# Patient Record
Sex: MALE | Race: WHITE | NOT HISPANIC OR LATINO | Employment: FULL TIME | URBAN - METROPOLITAN AREA
[De-identification: names, ages, dates, MRNs, and addresses within clinical notes are randomized per-mention and may not be internally consistent; named-entity substitution may affect disease eponyms.]

---

## 2019-03-15 PROBLEM — N44.00 RIGHT TESTICULAR TORSION: Status: ACTIVE | Noted: 2019-03-15

## 2019-12-17 ENCOUNTER — OCCUPATIONAL HEALTH (OUTPATIENT)
Dept: URGENT CARE | Facility: CLINIC | Age: 19
End: 2019-12-17

## 2019-12-17 DIAGNOSIS — N44.00 RIGHT TESTICULAR TORSION: ICD-10-CM

## 2019-12-17 LAB
CTP QC/QA: YES
POC 10 PANEL DRUG SCREEN: NEGATIVE
POC BREATH ALCOHOL: NEGATIVE

## 2019-12-18 LAB
ALBUMIN SERPL-MCNC: 4.9 G/DL (ref 3.5–5.5)
ALBUMIN/GLOB SERPL: 1.9 {RATIO} (ref 1.2–2.2)
ALP SERPL-CCNC: 60 IU/L (ref 39–117)
ALT SERPL-CCNC: 29 IU/L (ref 0–44)
APPEARANCE UR: CLEAR
AST SERPL-CCNC: 25 IU/L (ref 0–40)
BASOPHILS # BLD AUTO: 0 X10E3/UL (ref 0–0.2)
BASOPHILS NFR BLD AUTO: 1 %
BILIRUB SERPL-MCNC: 0.6 MG/DL (ref 0–1.2)
BILIRUB UR QL STRIP: NEGATIVE
BUN SERPL-MCNC: 8 MG/DL (ref 6–20)
BUN/CREAT SERPL: 8 (ref 9–20)
CALCIUM SERPL-MCNC: 9.9 MG/DL (ref 8.7–10.2)
CHLORIDE SERPL-SCNC: 100 MMOL/L (ref 96–106)
CHOLEST SERPL-MCNC: 163 MG/DL (ref 100–199)
CO2 SERPL-SCNC: 25 MMOL/L (ref 20–29)
COLOR UR: YELLOW
CREAT SERPL-MCNC: 0.98 MG/DL (ref 0.76–1.27)
EOSINOPHIL # BLD AUTO: 0.2 X10E3/UL (ref 0–0.4)
EOSINOPHIL NFR BLD AUTO: 4 %
ERYTHROCYTE [DISTWIDTH] IN BLOOD BY AUTOMATED COUNT: 13.3 % (ref 12.3–15.4)
GLOBULIN SER CALC-MCNC: 2.6 G/DL (ref 1.5–4.5)
GLUCOSE SERPL-MCNC: 105 MG/DL (ref 65–99)
GLUCOSE UR QL: NEGATIVE
HCT VFR BLD AUTO: 44 % (ref 37.5–51)
HDLC SERPL-MCNC: 40 MG/DL
HGB BLD-MCNC: 14.7 G/DL (ref 13–17.7)
HGB UR QL STRIP: NEGATIVE
HIV 1+2 AB+HIV1 P24 AG SERPL QL IA: NON REACTIVE
IMM GRANULOCYTES # BLD AUTO: 0 X10E3/UL (ref 0–0.1)
IMM GRANULOCYTES NFR BLD AUTO: 0 %
KETONES UR QL STRIP: NEGATIVE
LDLC SERPL CALC-MCNC: 101 MG/DL (ref 0–99)
LEUKOCYTE ESTERASE UR QL STRIP: NEGATIVE
LYMPHOCYTES # BLD AUTO: 1.8 X10E3/UL (ref 0.7–3.1)
LYMPHOCYTES NFR BLD AUTO: 35 %
MCH RBC QN AUTO: 30.2 PG (ref 26.6–33)
MCHC RBC AUTO-ENTMCNC: 33.4 G/DL (ref 31.5–35.7)
MCV RBC AUTO: 91 FL (ref 79–97)
MICRO URNS: NORMAL
MONOCYTES # BLD AUTO: 0.4 X10E3/UL (ref 0.1–0.9)
MONOCYTES NFR BLD AUTO: 7 %
NEUTROPHILS # BLD AUTO: 2.8 X10E3/UL (ref 1.4–7)
NEUTROPHILS NFR BLD AUTO: 53 %
NITRITE UR QL STRIP: NEGATIVE
PH UR STRIP: 7 [PH] (ref 5–7.5)
PLATELET # BLD AUTO: 217 X10E3/UL (ref 150–450)
POTASSIUM SERPL-SCNC: 5 MMOL/L (ref 3.5–5.2)
PROT SERPL-MCNC: 7.5 G/DL (ref 6–8.5)
PROT UR QL STRIP: NEGATIVE
RBC # BLD AUTO: 4.86 X10E6/UL (ref 4.14–5.8)
RPR SER QL: NON REACTIVE
SODIUM SERPL-SCNC: 141 MMOL/L (ref 134–144)
SP GR UR: 1.02 (ref 1–1.03)
TRIGL SERPL-MCNC: 112 MG/DL (ref 0–149)
UROBILINOGEN UR STRIP-MCNC: 1 MG/DL (ref 0.2–1)
VLDLC SERPL CALC-MCNC: 22 MG/DL (ref 5–40)
WBC # BLD AUTO: 5.2 X10E3/UL (ref 3.4–10.8)

## 2019-12-19 ENCOUNTER — OCCUPATIONAL HEALTH (OUTPATIENT)
Dept: URGENT CARE | Facility: CLINIC | Age: 19
End: 2019-12-19

## 2021-03-22 ENCOUNTER — OCCUPATIONAL HEALTH (OUTPATIENT)
Dept: URGENT CARE | Facility: CLINIC | Age: 21
End: 2021-03-22

## 2021-03-22 PROCEDURE — 80305 PR NON-DOT DRUG SCREENS: ICD-10-PCS | Mod: S$GLB,,, | Performed by: EMERGENCY MEDICINE

## 2021-03-22 PROCEDURE — 80305 DRUG TEST PRSMV DIR OPT OBS: CPT | Mod: S$GLB,,, | Performed by: EMERGENCY MEDICINE

## 2021-04-16 ENCOUNTER — CLINICAL SUPPORT (OUTPATIENT)
Dept: OTHER | Facility: CLINIC | Age: 21
End: 2021-04-16
Payer: COMMERCIAL

## 2021-04-16 DIAGNOSIS — Z00.8 ENCOUNTER FOR OTHER GENERAL EXAMINATION: ICD-10-CM

## 2021-04-16 PROCEDURE — 99401 PR PREVENT COUNSEL,INDIV,15 MIN: ICD-10-PCS | Mod: 25,S$GLB,, | Performed by: INTERNAL MEDICINE

## 2021-04-16 PROCEDURE — 80061 LIPID PANEL: CPT | Mod: QW,S$GLB,, | Performed by: INTERNAL MEDICINE

## 2021-04-16 PROCEDURE — 82947 PR  ASSAY QUANTITATIVE,BLOOD GLUCOSE: ICD-10-PCS | Mod: QW,S$GLB,, | Performed by: INTERNAL MEDICINE

## 2021-04-16 PROCEDURE — 99401 PREV MED CNSL INDIV APPRX 15: CPT | Mod: 25,S$GLB,, | Performed by: INTERNAL MEDICINE

## 2021-04-16 PROCEDURE — 82947 ASSAY GLUCOSE BLOOD QUANT: CPT | Mod: QW,S$GLB,, | Performed by: INTERNAL MEDICINE

## 2021-04-16 PROCEDURE — 80061 PR  LIPID PANEL: ICD-10-PCS | Mod: QW,S$GLB,, | Performed by: INTERNAL MEDICINE

## 2021-04-17 VITALS — BODY MASS INDEX: 31.66 KG/M2 | HEIGHT: 72 IN

## 2021-04-17 LAB
GLUCOSE SERPL-MCNC: 97 MG/DL (ref 60–140)
HDLC SERPL-MCNC: 41 MG/DL
POC CHOLESTEROL, LDL (DOCK): 93 MG/DL
POC CHOLESTEROL, TOTAL: 148 MG/DL
TRIGL SERPL-MCNC: 70 MG/DL

## 2021-05-10 ENCOUNTER — PATIENT MESSAGE (OUTPATIENT)
Dept: RESEARCH | Facility: HOSPITAL | Age: 21
End: 2021-05-10

## 2025-03-13 ENCOUNTER — OFFICE VISIT (OUTPATIENT)
Dept: FAMILY MEDICINE | Facility: CLINIC | Age: 25
End: 2025-03-13
Payer: COMMERCIAL

## 2025-03-13 VITALS
BODY MASS INDEX: 30.33 KG/M2 | HEART RATE: 78 BPM | OXYGEN SATURATION: 72 % | WEIGHT: 228.81 LBS | RESPIRATION RATE: 18 BRPM | SYSTOLIC BLOOD PRESSURE: 102 MMHG | TEMPERATURE: 98 F | DIASTOLIC BLOOD PRESSURE: 68 MMHG | HEIGHT: 73 IN

## 2025-03-13 DIAGNOSIS — Z79.899 ENCOUNTER FOR LONG-TERM CURRENT USE OF MEDICATION: ICD-10-CM

## 2025-03-13 DIAGNOSIS — Z23 NEED FOR VACCINATION: ICD-10-CM

## 2025-03-13 DIAGNOSIS — M54.12 RIGHT CERVICAL RADICULOPATHY: Primary | ICD-10-CM

## 2025-03-13 DIAGNOSIS — Z11.4 ENCOUNTER FOR SCREENING FOR HIV: ICD-10-CM

## 2025-03-13 DIAGNOSIS — Z11.59 NEED FOR HEPATITIS C SCREENING TEST: ICD-10-CM

## 2025-03-13 PROCEDURE — 99999 PR PBB SHADOW E&M-NEW PATIENT-LVL III: CPT | Mod: PBBFAC,,, | Performed by: DIETITIAN, REGISTERED
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
## 2025-03-20 ENCOUNTER — HOSPITAL ENCOUNTER (OUTPATIENT)
Dept: RADIOLOGY | Facility: HOSPITAL | Age: 25
Discharge: HOME | End: 2025-03-20
Attending: DIETITIAN, REGISTERED
Payer: COMMERCIAL

## 2025-03-20 ENCOUNTER — RESULTS FOLLOW-UP (OUTPATIENT)
Dept: FAMILY MEDICINE | Facility: CLINIC | Age: 25
End: 2025-03-20

## 2025-03-20 DIAGNOSIS — M54.12 RIGHT CERVICAL RADICULOPATHY: ICD-10-CM

## 2025-03-20 PROCEDURE — 72070 X-RAY EXAM THORAC SPINE 2VWS: CPT | Mod: TC,PO

## 2025-03-20 PROCEDURE — 72040 X-RAY EXAM NECK SPINE 2-3 VW: CPT | Mod: 26,,, | Performed by: STUDENT IN AN ORGANIZED HEALTH CARE EDUCATION/TRAINING PROGRAM

## 2025-03-20 PROCEDURE — 72040 X-RAY EXAM NECK SPINE 2-3 VW: CPT | Mod: TC,PO

## 2025-03-20 PROCEDURE — 72070 X-RAY EXAM THORAC SPINE 2VWS: CPT | Mod: 26,,, | Performed by: STUDENT IN AN ORGANIZED HEALTH CARE EDUCATION/TRAINING PROGRAM

## 2025-07-25 ENCOUNTER — OFFICE VISIT (OUTPATIENT)
Dept: FAMILY MEDICINE | Facility: CLINIC | Age: 25
End: 2025-07-25
Payer: COMMERCIAL

## 2025-07-25 ENCOUNTER — LAB VISIT (OUTPATIENT)
Dept: LAB | Facility: HOSPITAL | Age: 25
End: 2025-07-25
Attending: DIETITIAN, REGISTERED
Payer: COMMERCIAL

## 2025-07-25 VITALS
BODY MASS INDEX: 27.53 KG/M2 | HEART RATE: 91 BPM | WEIGHT: 207.69 LBS | TEMPERATURE: 98 F | OXYGEN SATURATION: 98 % | HEIGHT: 73 IN | RESPIRATION RATE: 18 BRPM | DIASTOLIC BLOOD PRESSURE: 68 MMHG | SYSTOLIC BLOOD PRESSURE: 116 MMHG

## 2025-07-25 DIAGNOSIS — R53.83 FATIGUE, UNSPECIFIED TYPE: ICD-10-CM

## 2025-07-25 DIAGNOSIS — K52.9 GASTROENTERITIS: Primary | ICD-10-CM

## 2025-07-25 DIAGNOSIS — R19.7 DIARRHEA, UNSPECIFIED TYPE: ICD-10-CM

## 2025-07-25 LAB
ANION GAP (OHS): 11 MMOL/L (ref 8–16)
BILIRUB UR QL STRIP.AUTO: NEGATIVE
BUN SERPL-MCNC: 11 MG/DL (ref 6–20)
CALCIUM SERPL-MCNC: 9.3 MG/DL (ref 8.7–10.5)
CHLORIDE SERPL-SCNC: 104 MMOL/L (ref 95–110)
CLARITY UR: CLEAR
CO2 SERPL-SCNC: 25 MMOL/L (ref 23–29)
COLOR UR AUTO: YELLOW
CREAT SERPL-MCNC: 0.9 MG/DL (ref 0.5–1.4)
ERYTHROCYTE [SEDIMENTATION RATE] IN BLOOD: 12 MM/HR
GFR SERPLBLD CREATININE-BSD FMLA CKD-EPI: >60 ML/MIN/1.73/M2
GLUCOSE SERPL-MCNC: 72 MG/DL (ref 70–110)
GLUCOSE UR QL STRIP: NEGATIVE
HETEROPH AB SERPL QL IA: NEGATIVE
HGB UR QL STRIP: NEGATIVE
KETONES UR QL STRIP: NEGATIVE
LDH SERPL-CCNC: 209 U/L (ref 110–260)
LEUKOCYTE ESTERASE UR QL STRIP: NEGATIVE
NITRITE UR QL STRIP: NEGATIVE
PH UR STRIP: 6 [PH]
POTASSIUM SERPL-SCNC: 3.6 MMOL/L (ref 3.5–5.1)
PROT UR QL STRIP: NEGATIVE
SODIUM SERPL-SCNC: 140 MMOL/L (ref 136–145)
SP GR UR STRIP: 1.02
TESTOST SERPL-MCNC: 554 NG/DL (ref 304–1227)
URATE SERPL-MCNC: 8.7 MG/DL (ref 3.4–7)
UROBILINOGEN UR STRIP-ACNC: NEGATIVE EU/DL

## 2025-07-25 PROCEDURE — 84550 ASSAY OF BLOOD/URIC ACID: CPT

## 2025-07-25 PROCEDURE — 99999 PR PBB SHADOW E&M-EST. PATIENT-LVL IV: CPT | Mod: PBBFAC,,, | Performed by: DIETITIAN, REGISTERED

## 2025-07-25 PROCEDURE — 86308 HETEROPHILE ANTIBODY SCREEN: CPT | Mod: PO

## 2025-07-25 PROCEDURE — 36415 COLL VENOUS BLD VENIPUNCTURE: CPT | Mod: PO

## 2025-07-25 PROCEDURE — 81003 URINALYSIS AUTO W/O SCOPE: CPT

## 2025-07-25 PROCEDURE — 80048 BASIC METABOLIC PNL TOTAL CA: CPT

## 2025-07-25 PROCEDURE — 85651 RBC SED RATE NONAUTOMATED: CPT | Mod: PO

## 2025-07-25 PROCEDURE — 83615 LACTATE (LD) (LDH) ENZYME: CPT

## 2025-07-25 PROCEDURE — 84403 ASSAY OF TOTAL TESTOSTERONE: CPT

## 2025-07-25 NOTE — PROGRESS NOTES
PLAN:    Assessment & Plan    IMPRESSION:  - Suspect viral gastroenteritis as cause of recurrent diarrhea episodes and fatigue.  - Consider electrolyte imbalance due to prolonged diarrhea as potential contributor to fatigue.    FATIGUE:  - Patient reports feeling fatigued lately with lack of motivation to start the day.  - Ordered testosterone test (to be done in the morning) to evaluate as potential cause, though less likely given recent illness.  - Will also check electrolytes and BNP to evaluate fatigue.  - Follow-up scheduled next Thursday for testosterone lab draw.    INFECTIOUS GASTROENTERITIS:  - Patient experienced 2 episodes of watery, lime green diarrhea over the past 4 months, each lasting 3-4 days with occurrences every 30-45 minutes.  - Family members had similar symptoms, suggesting a contagious cause.  - Ordered comprehensive stool panel (adenovirus, rotavirus, pancreatic elastase, fecal fat, giardia, C. difficile, stool culture, and parasites) to rule out infectious causes, and BMP to assess electrolyte status.  - Instructed patient to bring stool samples for analysis.  - Recommend OTC probiotic supplement containing Lactobacillus and Bifidobacterium to support gut healing.  - Advised to contact office if symptoms do not improve.  - Explained potential for rebound lactose intolerance following gastroenteritis due to blunting of intestinal villi.  - Advised patient to avoid dairy products for 2 weeks until feeling completely recovered.  - Patient reports only mild abdominal discomfort for 1 day during diarrhea episodes, with no abdominal pain during episodes.  - Currently denies heartburn or significant abdominal pain.        Problem List Items Addressed This Visit    None  Visit Diagnoses         Gastroenteritis    -  Primary    Relevant Orders    Pancreatic elastase, fecal    Fecal fat, qualitative    WBC, Stool    Rotavirus antigen, stool    Adenovirus Molecular Detection, PCR, Non-Blood Stool     Calprotectin, Stool    Giardia / Cryptosporidum, EIA    Stool Exam-Ova,Cysts,Parasites    Clostridium difficile EIA    Stool culture    Basic Metabolic Panel      Fatigue, unspecified type        Relevant Orders    Testosterone,Total    Lactate dehydrogenase (LDH)    Uric acid    Sedimentation rate, manual    Urinalysis    Monospot          Future Appointments       Date Specialty Appt Notes    7/25/2025 Lab     7/31/2025 Lab            Medication Management for assessment above:       Yenny Rashid, DO, RDN  ==========================================================================  Subjective:   Patient ID: Cam Smith is a 24 y.o. male.  has a past medical history of Diverticulitis.   Chief Complaint: Fatigue (Discuss getting testosterone checked)      History of Present Illness    CHIEF COMPLAINT:  Patient presents today for fatigue and recent episodes of diarrhea.    HISTORY OF PRESENT ILLNESS:  He reports experiencing fatigue at work, characterized by feeling tired and lacking motivation to complete tasks. He describes feeling less energetic compared to his usual state, noting that while he is not falling asleep during work, he feels a persistent sense of tiredness that impacts his typical work readiness and desire to initiate daily activities.    GASTROINTESTINAL:  He reports two recent episodes of stomach bugs over the past few months. Most recent episode began approximately one week ago, lasting 3-4 days, with watery, lime green to dark green diarrhea occurring at 30-45 minute intervals for three consecutive days. He denies vomiting, blood, or mucus in stool. Only one day of mild abdominal discomfort was noted during the episode. His wife and son experienced similar symptoms. He denies current abdominal pain, heartburn, or ongoing GI symptoms.    LABS:  Labs from approximately 3.5 months ago were all normal, including cholesterol, thyroid, iron, CBC, and other studies.      ROS:  General: -fever,  "-chills, +fatigue, -weight gain, -weight loss  Eyes: -vision changes, -redness, -discharge  ENT: -ear pain, -nasal congestion, -sore throat  Cardiovascular: -chest pain, -palpitations, -lower extremity edema  Respiratory: -cough, -shortness of breath  Gastrointestinal: +abdominal pain, -nausea, -vomiting, +diarrhea, -constipation, -blood in stool  Genitourinary: -dysuria, -hematuria, -frequency  Musculoskeletal: -joint pain, -muscle pain  Skin: -rash, -lesion  Neurological: -headache, -dizziness, -numbness, -tingling  Psychiatric: -anxiety, -depression, -sleep difficulty          Problem List Items Addressed This Visit    None  Visit Diagnoses         Gastroenteritis    -  Primary      Fatigue, unspecified type                 Review of patient's allergies indicates:   Allergen Reactions    Augmentin [amoxicillin-pot clavulanate] Hives     No current outpatient medications   I have reviewed the PMH, social history, FamilyHx, surgical history, allergies and medications documented / confirmed by the patient at the time of this visit.    Objective:   /68   Pulse 91   Temp 97.6 °F (36.4 °C)   Resp 18   Ht 6' 1" (1.854 m)   Wt 94.2 kg (207 lb 11.2 oz)   SpO2 98%   BMI 27.40 kg/m²   Physical Exam    General: No acute distress. Well-developed. Well-nourished.  Eyes: EOMI. Sclerae anicteric.  HENT: Normocephalic. Atraumatic. Nares patent. Moist oral mucosa.  Ears: Bilateral TMs clear. Bilateral EACs clear.  Cardiovascular: Regular rate. Regular rhythm. No murmurs. No rubs. No gallops. Normal S1, S2.  Respiratory: Normal respiratory effort. Clear to auscultation bilaterally. No rales. No rhonchi. No wheezing.  Abdomen: Soft. Mild abdominal tenderness. Non-distended. Normoactive bowel sounds.  Musculoskeletal: No  obvious deformity.  Extremities: No lower extremity edema.  Neurological: Alert & oriented x3. No slurred speech. Normal gait.  Psychiatric: Normal mood. Normal affect. Good insight. Good " judgment.  Skin: Warm. Dry. No rash.          Assessment:     1. Gastroenteritis    2. Fatigue, unspecified type      MDM:   Acute condition with systemic symptoms requiring unique testing, interpretation of testing, medication management and review of previous medical records.     Visit today included increased complexity associated with the care of the episodic problem see above assessment addressed and managing the longitudinal care of the patient due to the serious and/or complex managed problem(s) see above.    I have reviewed and summarized old records.  I have performed thorough medication reconciliation today and discussed risk and benefits of medications.  I have reviewed labs and discussed with patient.  All questions were answered.    I have signed for the following orders AND/OR meds.  Orders Placed This Encounter   Procedures    Clostridium difficile EIA     Standing Status:   Future     Expected Date:   7/25/2025     Expiration Date:   10/23/2026     Send normal result to authorizing provider's In Basket if patient is active on MyChart::   Yes    Stool culture     Standing Status:   Future     Expected Date:   7/25/2025     Expiration Date:   7/25/2026     Send normal result to authorizing provider's In Basket if patient is active on MyChart::   Yes    Pancreatic elastase, fecal     Standing Status:   Future     Expected Date:   7/25/2025     Expiration Date:   9/23/2026     Send normal result to authorizing provider's In Basket if patient is active on MyChart::   Yes    Fecal fat, qualitative     Standing Status:   Future     Expected Date:   7/25/2025     Expiration Date:   9/23/2026     Send normal result to authorizing provider's In Basket if patient is active on MyChart::   Yes    WBC, Stool     Standing Status:   Future     Expected Date:   7/25/2025     Expiration Date:   7/25/2026     Send normal result to authorizing provider's In Basket if patient is active on MyChart::   Yes    Rotavirus  antigen, stool     Standing Status:   Future     Expected Date:   7/25/2025     Expiration Date:   7/25/2026     Send normal result to authorizing provider's In Basket if patient is active on MyChart::   Yes    Adenovirus Molecular Detection, PCR, Non-Blood Stool     Standing Status:   Future     Expected Date:   7/25/2025     Expiration Date:   9/23/2026     Specimen Source:   Stool    Calprotectin, Stool     Standing Status:   Future     Expected Date:   7/25/2025     Expiration Date:   9/23/2026     Send normal result to authorizing provider's In Basket if patient is active on MyChart::   Yes    Giardia / Cryptosporidum, EIA     Standing Status:   Future     Expected Date:   7/25/2025     Expiration Date:   9/23/2026     Send normal result to authorizing provider's In Basket if patient is active on MyChart::   Yes    Stool Exam-Ova,Cysts,Parasites     Standing Status:   Future     Expected Date:   7/25/2025     Expiration Date:   7/25/2026     Specimen Source:   stool     Has diarrhea been present >/=7 days?:   Yes     Is the patient immunocompromised?:   No     In the last 30 days, has the patient traveled to a developing country or area of [place] where helminth infections are prevalent?:   No     Have worm segments been visible in stool/undergarments?:   No     Send normal result to authorizing provider's In Basket if patient is active on MyChart::   Yes    Basic Metabolic Panel     Standing Status:   Future     Number of Occurrences:   1     Expected Date:   7/25/2025     Expiration Date:   9/23/2026     Send normal result to authorizing provider's In Basket if patient is active on MyChart::   Yes    Testosterone,Total     Standing Status:   Future     Number of Occurrences:   1     Expected Date:   7/25/2025     Expiration Date:   7/25/2026     Send normal result to authorizing provider's In Basket if patient is active on MyChart::   Yes    Lactate dehydrogenase (LDH)     Standing Status:   Future      Number of Occurrences:   1     Expected Date:   7/25/2025     Expiration Date:   7/25/2026     Send normal result to authorizing provider's In Basket if patient is active on MyChart::   Yes    Uric acid     Standing Status:   Future     Number of Occurrences:   1     Expected Date:   7/25/2025     Expiration Date:   7/25/2026     Send normal result to authorizing provider's In Basket if patient is active on MyChart::   Yes    Sedimentation rate, manual     Standing Status:   Future     Number of Occurrences:   1     Expected Date:   7/25/2025     Expiration Date:   7/25/2026     Send normal result to authorizing provider's In Basket if patient is active on MyChart::   Yes    Urinalysis     Standing Status:   Future     Number of Occurrences:   1     Expected Date:   7/25/2025     Expiration Date:   9/23/2026     Collection Type:   Urine, Clean Catch    Monospot     Standing Status:   Future     Number of Occurrences:   1     Expected Date:   7/25/2025     Expiration Date:   7/25/2026     Send normal result to authorizing provider's In Basket if patient is active on MyChart::   Yes           No follow-ups on file.  Future Appointments       Date Specialty Appt Notes    7/25/2025 Lab     7/31/2025 Lab             If no improvement in symptoms or symptoms worsen, advised to call/follow-up at clinic or go to ER. Patient voiced understanding and all questions/concerns were addressed.     DISCLAIMER: This note was compiled by using a speech recognition dictation system and therefore please be aware that typographical / speech recognition errors can and do occur.  Please contact me if you see any errors specifically.     This note was generated with the assistance of ambient listening technology. Verbal consent was obtained by the patient and accompanying visitor(s) for the recording of patient appointment to facilitate this note. I attest to having reviewed and edited the generated note for accuracy, though some syntax or  spelling errors may persist. Please contact the author of this note for any clarification.      Yenny Rashid DO, RDN    Office: [phone] [id number] [place] [address]  FAX: [phone]

## 2025-07-31 ENCOUNTER — LAB VISIT (OUTPATIENT)
Dept: LAB | Facility: HOSPITAL | Age: 25
End: 2025-07-31
Attending: DIETITIAN, REGISTERED
Payer: COMMERCIAL

## 2025-07-31 DIAGNOSIS — K52.9 GASTROENTERITIS: ICD-10-CM

## 2025-07-31 PROCEDURE — 87045 FECES CULTURE AEROBIC BACT: CPT

## 2025-07-31 PROCEDURE — 82705 FATS/LIPIDS FECES QUAL: CPT

## 2025-07-31 PROCEDURE — 89055 LEUKOCYTE ASSESSMENT FECAL: CPT

## 2025-07-31 PROCEDURE — 87425 ROTAVIRUS AG IA: CPT

## 2025-07-31 PROCEDURE — 87329 GIARDIA AG IA: CPT

## 2025-07-31 PROCEDURE — 87427 SHIGA-LIKE TOXIN AG IA: CPT | Mod: 59

## 2025-07-31 PROCEDURE — 87798 DETECT AGENT NOS DNA AMP: CPT

## 2025-07-31 PROCEDURE — 87324 CLOSTRIDIUM AG IA: CPT

## 2025-07-31 PROCEDURE — 87177 OVA AND PARASITES SMEARS: CPT

## 2025-07-31 PROCEDURE — 83993 ASSAY FOR CALPROTECTIN FECAL: CPT

## 2025-07-31 PROCEDURE — 82653 EL-1 FECAL QUANTITATIVE: CPT

## 2025-07-31 PROCEDURE — 87046 STOOL CULTR AEROBIC BACT EA: CPT

## 2025-08-01 LAB
C COLI+JEJ+UPSA DNA STL QL NAA+NON-PROBE: NEGATIVE
C DIFF GDH STL QL: NEGATIVE
C DIFF TOX A+B STL QL IA: NEGATIVE
CALPROTECTIN INTERP (OHS): NORMAL
CALPROTECTIN STOOL (OHS): 34.6 ΜG/G
CRYPTOSP AG SPEC QL: NEGATIVE
ELASTASE, STOOL INTERPRETATION (OHS): NORMAL
G LAMBLIA AG STL QL IA: NEGATIVE
PANCREATIC ELASTASE, FECAL (OHS): >800 ΜG/G
RV AG STL QL IA.RAPID: NEGATIVE
WBC #/AREA STL HPF: ABNORMAL /[HPF]

## 2025-08-02 LAB
E COLI SXT1 STL QL IA: NEGATIVE
E COLI SXT2 STL QL IA: NEGATIVE

## 2025-08-03 LAB
FAT STL QL: NORMAL
HADV DNA SPEC QL NAA+PROBE: NEGATIVE
NEUTRAL FAT STL QL: NORMAL
SPECIMEN SOURCE: NORMAL

## 2025-08-04 LAB — BACTERIA STL CULT: NORMAL

## 2025-08-08 LAB — O+P STL MICRO: NORMAL
